# Patient Record
Sex: FEMALE | Race: OTHER | NOT HISPANIC OR LATINO | URBAN - METROPOLITAN AREA
[De-identification: names, ages, dates, MRNs, and addresses within clinical notes are randomized per-mention and may not be internally consistent; named-entity substitution may affect disease eponyms.]

---

## 2023-03-13 ENCOUNTER — OUTPATIENT (OUTPATIENT)
Dept: OUTPATIENT SERVICES | Facility: HOSPITAL | Age: 23
LOS: 1 days | End: 2023-03-13
Payer: COMMERCIAL

## 2023-03-13 ENCOUNTER — APPOINTMENT (OUTPATIENT)
Dept: OTOLARYNGOLOGY | Facility: CLINIC | Age: 23
End: 2023-03-13
Payer: COMMERCIAL

## 2023-03-13 VITALS
OXYGEN SATURATION: 98 % | TEMPERATURE: 97.1 F | WEIGHT: 105 LBS | BODY MASS INDEX: 17.49 KG/M2 | DIASTOLIC BLOOD PRESSURE: 81 MMHG | SYSTOLIC BLOOD PRESSURE: 119 MMHG | HEART RATE: 89 BPM | HEIGHT: 65 IN

## 2023-03-13 PROBLEM — Z00.00 ENCOUNTER FOR PREVENTIVE HEALTH EXAMINATION: Status: ACTIVE | Noted: 2023-03-13

## 2023-03-13 PROCEDURE — 70486 CT MAXILLOFACIAL W/O DYE: CPT | Mod: 26

## 2023-03-13 PROCEDURE — 70486 CT MAXILLOFACIAL W/O DYE: CPT

## 2023-03-13 PROCEDURE — 99203 OFFICE O/P NEW LOW 30 MIN: CPT

## 2023-03-13 NOTE — HISTORY OF PRESENT ILLNESS
[de-identified] : 22 year female presenting s/p nasal bone fracture. Patient hit her nose on the kitchen counter. No active bleeding, epistaxis, or other evident trauma to the head or face. Patient went to urgent care and had Xray done and is presenting to discuss need for surgery. She has no history of prior nasal or ENT surgery and no known history of septal deviation. Patient otherwise feels well and stable. She denies headache, dizziness, or diplopia.

## 2023-03-13 NOTE — DATA REVIEWED
[de-identified] : Take the CT scan of the nasal bones to fully evaluate the fracture she does have a nasal fracture very small on the right side which is nondisplaced nasal sutures are intact she has some mild asymmetry of the nasal septum at baseline but no evidence of a displaced nasal pyramid fracture.

## 2023-03-27 ENCOUNTER — NON-APPOINTMENT (OUTPATIENT)
Age: 23
End: 2023-03-27

## 2023-03-28 ENCOUNTER — APPOINTMENT (OUTPATIENT)
Dept: OTOLARYNGOLOGY | Facility: CLINIC | Age: 23
End: 2023-03-28
Payer: COMMERCIAL

## 2023-03-28 VITALS
SYSTOLIC BLOOD PRESSURE: 117 MMHG | HEART RATE: 92 BPM | OXYGEN SATURATION: 98 % | BODY MASS INDEX: 17.49 KG/M2 | WEIGHT: 105 LBS | HEIGHT: 65 IN | DIASTOLIC BLOOD PRESSURE: 81 MMHG

## 2023-03-28 PROCEDURE — 99213 OFFICE O/P EST LOW 20 MIN: CPT

## 2023-03-28 NOTE — PHYSICAL EXAM
[] : septum deviated to the left [Normal] : assessment of respiratory effort is normal [de-identified] : +bruising to right nose, septal deviated to left

## 2023-03-28 NOTE — HISTORY OF PRESENT ILLNESS
[de-identified] : 22 year female presenting s/p nasal bone fracture. Patient hit her nose on the kitchen counter. No active bleeding, epistaxis, or other evident trauma to the head or face. Patient went to urgent care and had Xray done and is presenting to discuss need for surgery. She has no history of prior nasal or ENT surgery and no known history of septal deviation. Patient otherwise feels well and stable. She denies headache, dizziness, or diplopia [FreeTextEntry1] : 3/28/2023:\par Patient presents today for follow up. She is feeling well and asymptomatic at this time. Scans reviewed with patient in office. She has some minor bruising but not having any pain at this time. She notices a slight asymmetry and feels her right side of nose, which was sight of the impact, is somewhat caved in compared to the left side. Her breathing is okay and she recently started working out again.

## 2023-07-11 NOTE — PHYSICAL EXAM
[Midline] : trachea located in midline position [de-identified] : +ecchymosis, swelling, and tenderness on palpation, no crepitus, no mobility, no stepoffs palpable [de-identified] : No evidence septal hematoma or septal fracture, no mucosal injury visible. No [Normal] : no neck adenopathy

## 2023-08-01 ENCOUNTER — APPOINTMENT (OUTPATIENT)
Dept: OTOLARYNGOLOGY | Facility: CLINIC | Age: 23
End: 2023-08-01
Payer: COMMERCIAL

## 2023-08-01 VITALS
BODY MASS INDEX: 17.16 KG/M2 | HEART RATE: 86 BPM | SYSTOLIC BLOOD PRESSURE: 129 MMHG | HEIGHT: 65 IN | DIASTOLIC BLOOD PRESSURE: 80 MMHG | WEIGHT: 103 LBS | TEMPERATURE: 98 F

## 2023-08-01 DIAGNOSIS — S02.2XXA FRACTURE OF NASAL BONES, INITIAL ENCOUNTER FOR CLOSED FRACTURE: ICD-10-CM

## 2023-08-01 PROCEDURE — 99203 OFFICE O/P NEW LOW 30 MIN: CPT

## 2023-08-01 PROCEDURE — 99213 OFFICE O/P EST LOW 20 MIN: CPT

## 2023-08-02 NOTE — PHYSICAL EXAM
[Normal] : no masses and lesions seen, face is symmetric [de-identified] : Well healed, midline, no evidence of deformity or nasal obstruction.

## 2023-08-02 NOTE — HISTORY OF PRESENT ILLNESS
[de-identified] :  22 year female presenting s/p nasal bone fracture. Patient hit her nose on the kitchen counter. No active bleeding, epistaxis, or other evident trauma to the head or face. Patient went to urgent care and had Xray done and is presenting to discuss need for surgery. She has no history of prior nasal or ENT surgery and no known history of septal deviation. Patient otherwise feels well and stable. She denies headache, dizziness, or diplopia.  [FreeTextEntry1] : 8/1/2023: Patient here today for follow up appointment. She is feeling well and has not seen any changes in the area. She gfeels the swelling has gone down and does not have any trouble breathing. No further trauma to the area.